# Patient Record
Sex: MALE | Race: BLACK OR AFRICAN AMERICAN | URBAN - METROPOLITAN AREA
[De-identification: names, ages, dates, MRNs, and addresses within clinical notes are randomized per-mention and may not be internally consistent; named-entity substitution may affect disease eponyms.]

---

## 2022-06-21 ENCOUNTER — WEB ENCOUNTER (OUTPATIENT)
Dept: URBAN - METROPOLITAN AREA CLINIC 105 | Facility: CLINIC | Age: 57
End: 2022-06-21

## 2022-06-21 ENCOUNTER — OFFICE VISIT (OUTPATIENT)
Dept: URBAN - METROPOLITAN AREA CLINIC 105 | Facility: CLINIC | Age: 57
End: 2022-06-21
Payer: COMMERCIAL

## 2022-06-21 ENCOUNTER — LAB OUTSIDE AN ENCOUNTER (OUTPATIENT)
Dept: URBAN - METROPOLITAN AREA CLINIC 105 | Facility: CLINIC | Age: 57
End: 2022-06-21

## 2022-06-21 VITALS
WEIGHT: 217.8 LBS | TEMPERATURE: 97.3 F | SYSTOLIC BLOOD PRESSURE: 82 MMHG | HEIGHT: 71 IN | DIASTOLIC BLOOD PRESSURE: 57 MMHG | BODY MASS INDEX: 30.49 KG/M2 | HEART RATE: 81 BPM

## 2022-06-21 DIAGNOSIS — B20 HIV INFECTION, UNSPECIFIED SYMPTOM STATUS: ICD-10-CM

## 2022-06-21 DIAGNOSIS — F12.90 MARIJUANA USE: ICD-10-CM

## 2022-06-21 DIAGNOSIS — K59.00 CONSTIPATION, UNSPECIFIED CONSTIPATION TYPE: ICD-10-CM

## 2022-06-21 DIAGNOSIS — Z12.11 SCREEN FOR COLON CANCER: ICD-10-CM

## 2022-06-21 DIAGNOSIS — R19.7 DIARRHEA, UNSPECIFIED TYPE: ICD-10-CM

## 2022-06-21 PROCEDURE — 99204 OFFICE O/P NEW MOD 45 MIN: CPT | Performed by: INTERNAL MEDICINE

## 2022-06-21 RX ORDER — BICTEGRAVIR SODIUM, EMTRICITABINE, AND TENOFOVIR ALAFENAMIDE FUMARATE 50; 200; 25 MG/1; MG/1; MG/1
TAKE 1 TABLET BY ORAL ROUTE ONCE DAILY FOR 90 DAYS ORAL ONCE A DAY TABLET ORAL
Qty: 30 EACH | Refills: 0 | Status: ACTIVE | COMMUNITY

## 2022-06-21 NOTE — HPI-TODAY'S VISIT:
The patient presents for diarrhea.   Today, the patient reports onset of intermittent watery diarrhea a couple years ago. Diarrhea is 1x/week with multiple episodes in a bout. He denies bleeding or abdominal pain. Between bouts, he sometimes can go at least 5 days without a BM. He noted increased diarrhea while on Biktarvy for HIV - d/c the med a year ago because of diarrhea. He says he has not discussed alternative meds with his PCP (FROLIAN Best at [facility]).  He is only on vit C currently. Social history: Last alcohol use was a couple months ago. No tobacco use. Last marijuana use was 2 weeks ago - frequency is 1x/month.

## 2022-06-23 LAB
A/G RATIO: 0.8
ALBUMIN: 3.8
ALKALINE PHOSPHATASE: 69
ALT (SGPT): 35
AST (SGOT): 42
BASO (ABSOLUTE): 0
BASOS: 0
BILIRUBIN, TOTAL: 0.5
BUN/CREATININE RATIO: 11
BUN: 17
CALCIUM: 9.2
CARBON DIOXIDE, TOTAL: 23
CHLORIDE: 100
CREATININE: 1.55
EGFR: 53
EOS (ABSOLUTE): 0.1
EOS: 1
GLOBULIN, TOTAL: 4.9
GLUCOSE: 113
HEMATOCRIT: 41.4
HEMATOLOGY COMMENTS:: (no result)
HEMOGLOBIN: 13.5
IMMATURE CELLS: (no result)
IMMATURE GRANS (ABS): 0
IMMATURE GRANULOCYTES: 0
IMMUNOGLOBULIN A, QN, SERUM: 194
LYMPHS (ABSOLUTE): 4
LYMPHS: 64
MCH: 29.7
MCHC: 32.6
MCV: 91
MONOCYTES(ABSOLUTE): 0.4
MONOCYTES: 7
NEUTROPHILS (ABSOLUTE): 1.8
NEUTROPHILS: 28
NRBC: (no result)
PLATELETS: 63
POTASSIUM: 4.2
PROTEIN, TOTAL: 8.7
RBC: 4.54
RDW: 14.2
SODIUM: 134
T-TRANSGLUTAMINASE (TTG) IGA: <2
T4,FREE(DIRECT): 1.08
TSH: 1.95
WBC: 6.3

## 2022-08-23 ENCOUNTER — OFFICE VISIT (OUTPATIENT)
Dept: URBAN - METROPOLITAN AREA CLINIC 105 | Facility: CLINIC | Age: 57
End: 2022-08-23

## 2022-10-03 ENCOUNTER — LAB OUTSIDE AN ENCOUNTER (OUTPATIENT)
Dept: URBAN - METROPOLITAN AREA CLINIC 105 | Facility: CLINIC | Age: 57
End: 2022-10-03

## 2022-10-04 ENCOUNTER — WEB ENCOUNTER (OUTPATIENT)
Dept: URBAN - METROPOLITAN AREA CLINIC 105 | Facility: CLINIC | Age: 57
End: 2022-10-04

## 2022-10-04 ENCOUNTER — OFFICE VISIT (OUTPATIENT)
Dept: URBAN - METROPOLITAN AREA CLINIC 105 | Facility: CLINIC | Age: 57
End: 2022-10-04
Payer: COMMERCIAL

## 2022-10-04 ENCOUNTER — LAB OUTSIDE AN ENCOUNTER (OUTPATIENT)
Dept: URBAN - METROPOLITAN AREA CLINIC 105 | Facility: CLINIC | Age: 57
End: 2022-10-04

## 2022-10-04 VITALS
WEIGHT: 212.8 LBS | BODY MASS INDEX: 29.79 KG/M2 | TEMPERATURE: 97.2 F | HEIGHT: 71 IN | DIASTOLIC BLOOD PRESSURE: 61 MMHG | SYSTOLIC BLOOD PRESSURE: 87 MMHG | HEART RATE: 85 BPM

## 2022-10-04 DIAGNOSIS — B20 HIV INFECTION, UNSPECIFIED SYMPTOM STATUS: ICD-10-CM

## 2022-10-04 DIAGNOSIS — K59.00 CONSTIPATION, UNSPECIFIED CONSTIPATION TYPE: ICD-10-CM

## 2022-10-04 DIAGNOSIS — F12.90 MARIJUANA USE: ICD-10-CM

## 2022-10-04 DIAGNOSIS — Z12.11 SCREEN FOR COLON CANCER: ICD-10-CM

## 2022-10-04 DIAGNOSIS — R19.7 DIARRHEA, UNSPECIFIED TYPE: ICD-10-CM

## 2022-10-04 PROCEDURE — 99214 OFFICE O/P EST MOD 30 MIN: CPT | Performed by: INTERNAL MEDICINE

## 2022-10-04 RX ORDER — LOPERAMIDE HYDROCHLORIDE 2 MG/1
1 CAPSULE AS NEEDED CAPSULE ORAL
Status: ACTIVE | COMMUNITY

## 2022-10-04 RX ORDER — BICTEGRAVIR SODIUM, EMTRICITABINE, AND TENOFOVIR ALAFENAMIDE FUMARATE 50; 200; 25 MG/1; MG/1; MG/1
TAKE 1 TABLET BY ORAL ROUTE ONCE DAILY FOR 90 DAYS ORAL ONCE A DAY TABLET ORAL
Qty: 30 EACH | Refills: 0 | Status: ON HOLD | COMMUNITY

## 2022-10-12 LAB — GASTROINTESTINAL PATHOGEN: (no result)

## 2022-10-18 ENCOUNTER — TELEPHONE ENCOUNTER (OUTPATIENT)
Dept: URBAN - METROPOLITAN AREA CLINIC 105 | Facility: CLINIC | Age: 57
End: 2022-10-18

## 2022-11-14 ENCOUNTER — OFFICE VISIT (OUTPATIENT)
Dept: URBAN - METROPOLITAN AREA SURGERY CENTER 16 | Facility: SURGERY CENTER | Age: 57
End: 2022-11-14

## 2022-11-21 ENCOUNTER — OFFICE VISIT (OUTPATIENT)
Dept: URBAN - METROPOLITAN AREA SURGERY CENTER 16 | Facility: SURGERY CENTER | Age: 57
End: 2022-11-21

## 2022-11-21 ENCOUNTER — CLAIMS CREATED FROM THE CLAIM WINDOW (OUTPATIENT)
Dept: URBAN - METROPOLITAN AREA SURGERY CENTER 16 | Facility: SURGERY CENTER | Age: 57
End: 2022-11-21
Payer: COMMERCIAL

## 2022-11-21 DIAGNOSIS — K63.89 BACTERIAL OVERGROWTH SYNDROME: ICD-10-CM

## 2022-11-21 DIAGNOSIS — R19.7 ACUTE DIARRHEA: ICD-10-CM

## 2022-11-21 PROCEDURE — G8907 PT DOC NO EVENTS ON DISCHARG: HCPCS | Performed by: INTERNAL MEDICINE

## 2022-11-21 PROCEDURE — 45380 COLONOSCOPY AND BIOPSY: CPT | Performed by: INTERNAL MEDICINE

## 2022-11-21 RX ORDER — LOPERAMIDE HYDROCHLORIDE 2 MG/1
1 CAPSULE AS NEEDED CAPSULE ORAL
Status: ACTIVE | COMMUNITY

## 2022-11-21 RX ORDER — BICTEGRAVIR SODIUM, EMTRICITABINE, AND TENOFOVIR ALAFENAMIDE FUMARATE 50; 200; 25 MG/1; MG/1; MG/1
TAKE 1 TABLET BY ORAL ROUTE ONCE DAILY FOR 90 DAYS ORAL ONCE A DAY TABLET ORAL
Qty: 30 EACH | Refills: 0 | Status: ON HOLD | COMMUNITY

## 2022-11-29 ENCOUNTER — WEB ENCOUNTER (OUTPATIENT)
Dept: URBAN - METROPOLITAN AREA CLINIC 105 | Facility: CLINIC | Age: 57
End: 2022-11-29

## 2022-11-29 ENCOUNTER — OFFICE VISIT (OUTPATIENT)
Dept: URBAN - METROPOLITAN AREA CLINIC 105 | Facility: CLINIC | Age: 57
End: 2022-11-29
Payer: COMMERCIAL

## 2022-11-29 VITALS
SYSTOLIC BLOOD PRESSURE: 110 MMHG | DIASTOLIC BLOOD PRESSURE: 75 MMHG | HEART RATE: 75 BPM | HEIGHT: 71 IN | BODY MASS INDEX: 29.37 KG/M2 | TEMPERATURE: 97.1 F | WEIGHT: 209.8 LBS

## 2022-11-29 DIAGNOSIS — Z12.11 SCREEN FOR COLON CANCER: ICD-10-CM

## 2022-11-29 DIAGNOSIS — R19.7 DIARRHEA, UNSPECIFIED TYPE: ICD-10-CM

## 2022-11-29 DIAGNOSIS — K59.00 CONSTIPATION, UNSPECIFIED CONSTIPATION TYPE: ICD-10-CM

## 2022-11-29 DIAGNOSIS — F12.90 MARIJUANA USE: ICD-10-CM

## 2022-11-29 DIAGNOSIS — B20 HIV INFECTION, UNSPECIFIED SYMPTOM STATUS: ICD-10-CM

## 2022-11-29 PROCEDURE — 99213 OFFICE O/P EST LOW 20 MIN: CPT | Performed by: INTERNAL MEDICINE

## 2022-11-29 RX ORDER — LOPERAMIDE HYDROCHLORIDE 2 MG/1
1 CAPSULE AS NEEDED CAPSULE ORAL
Status: ACTIVE | COMMUNITY

## 2022-11-29 RX ORDER — BICTEGRAVIR SODIUM, EMTRICITABINE, AND TENOFOVIR ALAFENAMIDE FUMARATE 50; 200; 25 MG/1; MG/1; MG/1
TAKE 1 TABLET BY ORAL ROUTE ONCE DAILY FOR 90 DAYS ORAL ONCE A DAY TABLET ORAL
Qty: 30 EACH | Refills: 0 | Status: ON HOLD | COMMUNITY

## 2022-11-29 NOTE — HPI-TODAY'S VISIT:
The patient presents on follow-up for diarrhea.   On 6/21/22, the patient reported onset of intermittent watery diarrhea a couple years ago. Diarrhea was 1x/week with multiple episodes in a bout. He denied bleeding or abdominal pain. Between bouts, he sometimes could go at least 5 days without a BM. He noted increased diarrhea while on Biktarvy for HIV - d/c the med a year ago because of diarrhea. He said he had not discussed alternative meds with his PCP (FROILAN Best at [facility]).  He was only on vit C currently. Social history: Last alcohol use was a couple months ago. No tobacco use. Last marijuana use was 2 weeks ago - frequency was 1x/month.  On 10/3/22, he said he got the x-ray done following our appt. He also sent in a stool sample in June, but there was an issue with the sample, so he had to resubmit a sample - resubmitted it yesterday. He was still having diarrhea. He saw his PCP since our visit - rx'd metronidazole and loperamide. No stool study was done with the PCP he stated. He did not take metronidazole, but had been taking loperamide for the last 2 weeks, taking 2 pills 1x in a day. Loperamide helped for awhile, but diarrhea returned. He could go 1-3 days without a BM then have days of profuse diarrhea.  He had not been on HIV treatment for almost a year - had initially d/c Biktarvy because he was having diarrhea. He said his PCP had not discussed alternative treatment options with him.  HPI: Today, he says that after his colon, he took metronidazole 3x starting on the 22nd - 1 dose 3 consecutive days. 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  Labs 10/3/22 - Stool study negative. 6/21/22 - CBC normal except platelets 63. CMP normal except creatinine 1.55, AST 42, GFR 53. Celiac negative. TSH/FT4 normal.

## 2022-12-01 ENCOUNTER — TELEPHONE ENCOUNTER (OUTPATIENT)
Dept: URBAN - METROPOLITAN AREA CLINIC 92 | Facility: CLINIC | Age: 57
End: 2022-12-01

## 2022-12-01 RX ORDER — BUDESONIDE 3 MG/1
3 CAPSULES CAPSULE ORAL ONCE A DAY
Qty: 90 | Refills: 2 | OUTPATIENT
Start: 2022-12-01

## 2022-12-01 RX ORDER — BICTEGRAVIR SODIUM, EMTRICITABINE, AND TENOFOVIR ALAFENAMIDE FUMARATE 50; 200; 25 MG/1; MG/1; MG/1
TAKE 1 TABLET BY ORAL ROUTE ONCE DAILY FOR 90 DAYS ORAL ONCE A DAY TABLET ORAL
Qty: 30 EACH | Refills: 0 | Status: ON HOLD | COMMUNITY

## 2022-12-01 RX ORDER — LOPERAMIDE HYDROCHLORIDE 2 MG/1
1 CAPSULE AS NEEDED CAPSULE ORAL
Status: ACTIVE | COMMUNITY

## 2022-12-09 ENCOUNTER — TELEPHONE ENCOUNTER (OUTPATIENT)
Dept: URBAN - METROPOLITAN AREA CLINIC 105 | Facility: CLINIC | Age: 57
End: 2022-12-09

## 2022-12-09 ENCOUNTER — TELEPHONE ENCOUNTER (OUTPATIENT)
Dept: URBAN - METROPOLITAN AREA CLINIC 92 | Facility: CLINIC | Age: 57
End: 2022-12-09

## 2022-12-20 ENCOUNTER — OFFICE VISIT (OUTPATIENT)
Dept: URBAN - METROPOLITAN AREA CLINIC 105 | Facility: CLINIC | Age: 57
End: 2022-12-20
Payer: COMMERCIAL

## 2022-12-20 VITALS
HEIGHT: 71 IN | TEMPERATURE: 97.7 F | WEIGHT: 210 LBS | HEART RATE: 92 BPM | DIASTOLIC BLOOD PRESSURE: 64 MMHG | SYSTOLIC BLOOD PRESSURE: 101 MMHG | BODY MASS INDEX: 29.4 KG/M2

## 2022-12-20 DIAGNOSIS — R19.7 DIARRHEA, UNSPECIFIED TYPE: ICD-10-CM

## 2022-12-20 DIAGNOSIS — F12.90 MARIJUANA USE: ICD-10-CM

## 2022-12-20 DIAGNOSIS — B20 HIV INFECTION, UNSPECIFIED SYMPTOM STATUS: ICD-10-CM

## 2022-12-20 DIAGNOSIS — K59.00 CONSTIPATION, UNSPECIFIED CONSTIPATION TYPE: ICD-10-CM

## 2022-12-20 DIAGNOSIS — Z12.11 SCREEN FOR COLON CANCER: ICD-10-CM

## 2022-12-20 DIAGNOSIS — K52.832 LYMPHOCYTIC COLITIS: ICD-10-CM

## 2022-12-20 PROCEDURE — 99213 OFFICE O/P EST LOW 20 MIN: CPT | Performed by: INTERNAL MEDICINE

## 2022-12-20 RX ORDER — BUDESONIDE 3 MG/1
3 CAPSULES CAPSULE ORAL ONCE A DAY
Qty: 90 | Refills: 2 | Status: ACTIVE | COMMUNITY
Start: 2022-12-01

## 2022-12-20 RX ORDER — BICTEGRAVIR SODIUM, EMTRICITABINE, AND TENOFOVIR ALAFENAMIDE FUMARATE 50; 200; 25 MG/1; MG/1; MG/1
TAKE 1 TABLET BY ORAL ROUTE ONCE DAILY FOR 90 DAYS ORAL ONCE A DAY TABLET ORAL
Qty: 30 EACH | Refills: 0 | Status: ON HOLD | COMMUNITY

## 2022-12-20 RX ORDER — LOPERAMIDE HYDROCHLORIDE 2 MG/1
1 CAPSULE AS NEEDED CAPSULE ORAL
Status: ON HOLD | COMMUNITY

## 2022-12-20 RX ORDER — METRONIDAZOLE 500 MG/1
1 TABLET TABLET ORAL THREE TIMES A DAY
Qty: 42 TABLET | Refills: 0 | Status: ACTIVE | COMMUNITY
Start: 2022-12-20 | End: 2023-01-03

## 2022-12-20 NOTE — HPI-TODAY'S VISIT:
The patient presents on follow-up for diarrhea.   On 6/21/22, the patient reported onset of intermittent watery diarrhea a couple years ago. Diarrhea was 1x/week with multiple episodes in a bout. He denied bleeding or abdominal pain. Between bouts, he sometimes could go at least 5 days without a BM. He noted increased diarrhea while on Biktarvy for HIV - d/c the med a year ago because of diarrhea. He said he had not discussed alternative meds with his PCP (FROILAN Best at [facility]).  He was only on vit C currently. Social history: Last alcohol use was a couple months ago. No tobacco use. Last marijuana use was 2 weeks ago - frequency was 1x/month.  On 10/3/22, he said he got the x-ray done following our appt. He also sent in a stool sample in June, but there was an issue with the sample, so he had to resubmit a sample - resubmitted it yesterday. He was still having diarrhea. He saw his PCP since our visit - rx'd metronidazole and loperamide. No stool study was done with the PCP he stated. He did not take metronidazole, but had been taking loperamide for the last 2 weeks, taking 2 pills 1x in a day. Loperamide helped for awhile, but diarrhea returned. He could go 1-3 days without a BM then have days of profuse diarrhea.  He had not been on HIV treatment for almost a year - had initially d/c Biktarvy because he was having diarrhea. He said his PCP had not discussed alternative treatment options with him.  On 11/29/22, he said that after his colon, he took metronidazole 3x starting on the 22nd - 1 dose 3 consecutive days. He had smear incontinence since his colon, but no BM until today.  HPI: Today, he says he tried Miralax for 3-4 days, but not consistently because he is dealing with health issues among different family members. He has been taking metronidazole PRN for his bowel habit. He can't recall if he has done a bowel prep. He says he was given loperamide, not budesonide, by the pharmacy and is taking it TID.   Labs 10/3/22 - Stool study negative. 6/21/22 - CBC normal except platelets 63. CMP normal except creatinine 1.55, AST 42, GFR 53. Celiac negative. TSH/FT4 normal.

## 2023-01-03 ENCOUNTER — OFFICE VISIT (OUTPATIENT)
Dept: URBAN - METROPOLITAN AREA CLINIC 105 | Facility: CLINIC | Age: 58
End: 2023-01-03

## 2023-01-03 RX ORDER — METRONIDAZOLE 500 MG/1
1 TABLET TABLET ORAL THREE TIMES A DAY
Qty: 42 TABLET | Refills: 0 | Status: ACTIVE | COMMUNITY
Start: 2022-12-20 | End: 2023-01-03

## 2023-01-03 RX ORDER — BICTEGRAVIR SODIUM, EMTRICITABINE, AND TENOFOVIR ALAFENAMIDE FUMARATE 50; 200; 25 MG/1; MG/1; MG/1
TAKE 1 TABLET BY ORAL ROUTE ONCE DAILY FOR 90 DAYS ORAL ONCE A DAY TABLET ORAL
Qty: 30 EACH | Refills: 0 | Status: ON HOLD | COMMUNITY

## 2023-01-03 RX ORDER — BUDESONIDE 3 MG/1
3 CAPSULES CAPSULE ORAL ONCE A DAY
Qty: 90 | Refills: 2 | Status: ACTIVE | COMMUNITY
Start: 2022-12-01

## 2023-01-03 RX ORDER — LOPERAMIDE HYDROCHLORIDE 2 MG/1
1 CAPSULE AS NEEDED CAPSULE ORAL
Status: ON HOLD | COMMUNITY

## 2023-01-03 NOTE — HPI-TODAY'S VISIT:
The patient presents on follow-up for diarrhea.   On 6/21/22, the patient reported onset of intermittent watery diarrhea a couple years ago. Diarrhea was 1x/week with multiple episodes in a bout. He denied bleeding or abdominal pain. Between bouts, he sometimes could go at least 5 days without a BM. He noted increased diarrhea while on Biktarvy for HIV - d/c the med a year ago because of diarrhea. He said he had not discussed alternative meds with his PCP (FROILAN Best at [facility]).  He was only on vit C currently. Social history: Last alcohol use was a couple months ago. No tobacco use. Last marijuana use was 2 weeks ago - frequency was 1x/month.  On 10/3/22, he said he got the x-ray done following our appt. He also sent in a stool sample in June, but there was an issue with the sample, so he had to resubmit a sample - resubmitted it yesterday. He was still having diarrhea. He saw his PCP since our visit - rx'd metronidazole and loperamide. No stool study was done with the PCP he stated. He did not take metronidazole, but had been taking loperamide for the last 2 weeks, taking 2 pills 1x in a day. Loperamide helped for awhile, but diarrhea returned. He could go 1-3 days without a BM then have days of profuse diarrhea.  He had not been on HIV treatment for almost a year - had initially d/c Biktarvy because he was having diarrhea. He said his PCP had not discussed alternative treatment options with him.  On 11/29/22, he said that after his colon, he took metronidazole 3x starting on the 22nd - 1 dose 3 consecutive days. He had smear incontinence since his colon, but no BM until today.  On 12/20/22, he said he tried Miralax for 3-4 days, but not consistently because he was dealing with health issues among different family members. He had been taking metronidazole PRN for his bowel habit. He could not recall if he had done a bowel prep. He said he was given loperamide, not budesonide, by the pharmacy and was taking it TID.   Labs 10/3/22 - Stool study negative. 6/21/22 - CBC normal except platelets 63. CMP normal except creatinine 1.55, AST 42, GFR 53. Celiac negative. TSH/FT4 normal.

## 2023-01-31 ENCOUNTER — OFFICE VISIT (OUTPATIENT)
Dept: URBAN - METROPOLITAN AREA CLINIC 105 | Facility: CLINIC | Age: 58
End: 2023-01-31
Payer: COMMERCIAL

## 2023-01-31 VITALS
TEMPERATURE: 96.4 F | DIASTOLIC BLOOD PRESSURE: 66 MMHG | HEART RATE: 68 BPM | BODY MASS INDEX: 29.18 KG/M2 | HEIGHT: 71 IN | WEIGHT: 208.4 LBS | SYSTOLIC BLOOD PRESSURE: 94 MMHG

## 2023-01-31 DIAGNOSIS — R19.7 DIARRHEA, UNSPECIFIED TYPE: ICD-10-CM

## 2023-01-31 DIAGNOSIS — F12.90 MARIJUANA USE: ICD-10-CM

## 2023-01-31 DIAGNOSIS — B20 HIV INFECTION, UNSPECIFIED SYMPTOM STATUS: ICD-10-CM

## 2023-01-31 DIAGNOSIS — K52.832 LYMPHOCYTIC COLITIS: ICD-10-CM

## 2023-01-31 DIAGNOSIS — K59.00 CONSTIPATION, UNSPECIFIED CONSTIPATION TYPE: ICD-10-CM

## 2023-01-31 DIAGNOSIS — Z12.11 SCREEN FOR COLON CANCER: ICD-10-CM

## 2023-01-31 PROCEDURE — 99214 OFFICE O/P EST MOD 30 MIN: CPT | Performed by: INTERNAL MEDICINE

## 2023-01-31 RX ORDER — BUDESONIDE 3 MG/1
3 CAPSULES CAPSULE ORAL ONCE A DAY
Qty: 90 | Refills: 2 | COMMUNITY
Start: 2022-12-01

## 2023-01-31 RX ORDER — LINACLOTIDE 72 UG/1
1 -2 CAPSULES ON AN EMPTY STOMACH CAPSULE, GELATIN COATED ORAL ONCE A DAY
Qty: 30 | Refills: 2 | OUTPATIENT
Start: 2023-01-31 | End: 2023-05-01

## 2023-01-31 RX ORDER — BICTEGRAVIR SODIUM, EMTRICITABINE, AND TENOFOVIR ALAFENAMIDE FUMARATE 50; 200; 25 MG/1; MG/1; MG/1
TAKE 1 TABLET BY ORAL ROUTE ONCE DAILY FOR 90 DAYS ORAL ONCE A DAY TABLET ORAL
Qty: 30 EACH | Refills: 0 | Status: ACTIVE | COMMUNITY

## 2023-01-31 RX ORDER — LOPERAMIDE HYDROCHLORIDE 2 MG/1
1 CAPSULE AS NEEDED CAPSULE ORAL
Status: ACTIVE | COMMUNITY

## 2023-01-31 NOTE — HPI-TODAY'S VISIT:
The patient presents on follow-up for diarrhea.   On 6/21/22, the patient reported onset of intermittent watery diarrhea a couple years ago. Diarrhea was 1x/week with multiple episodes in a bout. He denied bleeding or abdominal pain. Between bouts, he sometimes could go at least 5 days without a BM. He noted increased diarrhea while on Biktarvy for HIV - d/c the med a year ago because of diarrhea. He said he had not discussed alternative meds with his PCP (FROILAN Best at [facility]).  He was only on vit C currently. Social history: Last alcohol use was a couple months ago. No tobacco use. Last marijuana use was 2 weeks ago - frequency was 1x/month.  On 10/3/22, he said he got the x-ray done following our appt. He also sent in a stool sample in June, but there was an issue with the sample, so he had to resubmit a sample - resubmitted it yesterday. He was still having diarrhea. He saw his PCP since our visit - rx'd metronidazole and loperamide. No stool study was done with the PCP he stated. He did not take metronidazole, but had been taking loperamide for the last 2 weeks, taking 2 pills 1x in a day. Loperamide helped for awhile, but diarrhea returned. He could go 1-3 days without a BM then have days of profuse diarrhea.  He had not been on HIV treatment for almost a year - had initially d/c Biktarvy because he was having diarrhea. He said his PCP had not discussed alternative treatment options with him.  On 11/29/22, he said that after his colon, he took metronidazole 3x starting on the 22nd - 1 dose 3 consecutive days. He had smear incontinence since his colon, but no BM until today.  On 12/20/22, he said he tried Miralax for 3-4 days, but not consistently because he was dealing with health issues among different family members. He had been taking metronidazole PRN for his bowel habit. He could not recall if he had done a bowel prep. He said he was given loperamide, not budesonide, by the pharmacy and was taking it TID.  HPI: Today, he says Miralax 1 cap QD was working, but he has been having frequent diarrhea (at least 5-6x/day - watery) the past couple days. He has already had 3-4 BMs this morning. Prior, he was going 3-4 days w/o a BM and had some incomplete evac (no straining) whenever he would have a BM. He has not been taking Imodium.   Labs 10/3/22 - Stool study negative. 6/21/22 - CBC normal except platelets 63. CMP normal except creatinine 1.55, AST 42, GFR 53. Celiac negative. TSH/FT4 normal.

## 2023-02-22 ENCOUNTER — TELEPHONE ENCOUNTER (OUTPATIENT)
Dept: URBAN - METROPOLITAN AREA CLINIC 105 | Facility: CLINIC | Age: 58
End: 2023-02-22

## 2023-02-28 ENCOUNTER — WEB ENCOUNTER (OUTPATIENT)
Dept: URBAN - METROPOLITAN AREA CLINIC 105 | Facility: CLINIC | Age: 58
End: 2023-02-28

## 2023-03-03 ENCOUNTER — OFFICE VISIT (OUTPATIENT)
Dept: URBAN - METROPOLITAN AREA CLINIC 105 | Facility: CLINIC | Age: 58
End: 2023-03-03

## 2023-03-03 ENCOUNTER — OFFICE VISIT (OUTPATIENT)
Dept: URBAN - METROPOLITAN AREA TELEHEALTH 2 | Facility: TELEHEALTH | Age: 58
End: 2023-03-03
Payer: COMMERCIAL

## 2023-03-03 ENCOUNTER — TELEPHONE ENCOUNTER (OUTPATIENT)
Dept: URBAN - METROPOLITAN AREA CLINIC 105 | Facility: CLINIC | Age: 58
End: 2023-03-03

## 2023-03-03 ENCOUNTER — WEB ENCOUNTER (OUTPATIENT)
Dept: URBAN - METROPOLITAN AREA CLINIC 105 | Facility: CLINIC | Age: 58
End: 2023-03-03

## 2023-03-03 VITALS — BODY MASS INDEX: 29.18 KG/M2 | WEIGHT: 208.4 LBS | HEIGHT: 71 IN

## 2023-03-03 VITALS — HEIGHT: 71 IN

## 2023-03-03 DIAGNOSIS — K59.00 CONSTIPATION, UNSPECIFIED CONSTIPATION TYPE: ICD-10-CM

## 2023-03-03 DIAGNOSIS — K52.832 LYMPHOCYTIC COLITIS: ICD-10-CM

## 2023-03-03 DIAGNOSIS — R19.7 ACUTE DIARRHEA: ICD-10-CM

## 2023-03-03 DIAGNOSIS — B20 HIV INFECTION, UNSPECIFIED SYMPTOM STATUS: ICD-10-CM

## 2023-03-03 PROBLEM — 86406008: Status: ACTIVE | Noted: 2022-06-21

## 2023-03-03 PROBLEM — 14760008: Status: ACTIVE | Noted: 2022-06-21

## 2023-03-03 PROCEDURE — 99214 OFFICE O/P EST MOD 30 MIN: CPT | Performed by: INTERNAL MEDICINE

## 2023-03-03 RX ORDER — BUDESONIDE 3 MG/1
3 CAPSULES CAPSULE ORAL ONCE A DAY
Qty: 90 | Refills: 2 | COMMUNITY
Start: 2022-12-01

## 2023-03-03 RX ORDER — LOPERAMIDE HYDROCHLORIDE 2 MG/1
1 CAPSULE AS NEEDED CAPSULE ORAL
Status: ACTIVE | COMMUNITY

## 2023-03-03 RX ORDER — BICTEGRAVIR SODIUM, EMTRICITABINE, AND TENOFOVIR ALAFENAMIDE FUMARATE 50; 200; 25 MG/1; MG/1; MG/1
TAKE 1 TABLET BY ORAL ROUTE ONCE DAILY FOR 90 DAYS ORAL ONCE A DAY TABLET ORAL
Qty: 30 EACH | Refills: 0 | Status: ACTIVE | COMMUNITY

## 2023-03-03 RX ORDER — BUDESONIDE 3 MG/1
3 CAPSULES CAPSULE ORAL ONCE A DAY
Qty: 90 | Refills: 2 | OUTPATIENT
Start: 2022-12-01

## 2023-03-03 RX ORDER — LINACLOTIDE 72 UG/1
1 -2 CAPSULES ON AN EMPTY STOMACH CAPSULE, GELATIN COATED ORAL ONCE A DAY
Qty: 30 | Refills: 2 | Status: ACTIVE | COMMUNITY
Start: 2023-01-31 | End: 2023-05-01

## 2023-03-03 NOTE — HPI-TODAY'S VISIT:
The patient presents on follow-up for diarrhea.   On 6/21/22, the patient reported onset of intermittent watery diarrhea a couple years ago. Diarrhea was 1x/week with multiple episodes in a bout. He denied bleeding or abdominal pain. Between bouts, he sometimes could go at least 5 days without a BM. He noted increased diarrhea while on Biktarvy for HIV - d/c the med a year ago because of diarrhea. He said he had not discussed alternative meds with his PCP (FROILAN Best at [facility]).  He was only on vit C currently. Social history: Last alcohol use was a couple months ago. No tobacco use. Last marijuana use was 2 weeks ago - frequency was 1x/month.  On 10/3/22, he said he got the x-ray done following our appt. He also sent in a stool sample in June, but there was an issue with the sample, so he had to resubmit a sample - resubmitted it yesterday. He was still having diarrhea. He saw his PCP since our visit - rx'd metronidazole and loperamide. No stool study was done with the PCP he stated. He did not take metronidazole, but had been taking loperamide for the last 2 weeks, taking 2 pills 1x in a day. Loperamide helped for awhile, but diarrhea returned. He could go 1-3 days without a BM then have days of profuse diarrhea.  He had not been on HIV treatment for almost a year - had initially d/c Biktarvy because he was having diarrhea. He said his PCP had not discussed alternative treatment options with him.  On 11/29/22, he said that after his colon, he took metronidazole 3x starting on the 22nd - 1 dose 3 consecutive days. He had smear incontinence since his colon, but no BM until today.  On 12/20/22, he said he tried Miralax for 3-4 days, but not consistently because he was dealing with health issues among different family members. He had been taking metronidazole PRN for his bowel habit. He could not recall if he had done a bowel prep. He said he was given loperamide, not budesonide, by the pharmacy and was taking it TID.  On 1/31/23, he said Miralax 1 cap QD was working, but he had been having frequent diarrhea (at least 5-6x/day - watery) the past couple days. He had already had 3-4 BMs this morning. Prior, he was going 3-4 days w/o a BM and had some incomplete evac (no straining) whenever he would have a BM. He had not been taking Imodium.  HPI: Today, he says he last took Miralax 2 days ago - takes QOD. He also takes an Imodium if not taking Miralax. He is not on Linzess due to insurance change. He tried it for a 1.5 weeks daily, but had too much diarrhea. He has 5-6 watery BMs QD.    Labs 10/3/22 - Stool study negative. 6/21/22 - CBC normal except platelets 63. CMP normal except creatinine 1.55, AST 42, GFR 53. Celiac negative. TSH/FT4 normal.

## 2023-03-07 ENCOUNTER — WEB ENCOUNTER (OUTPATIENT)
Dept: URBAN - METROPOLITAN AREA CLINIC 105 | Facility: CLINIC | Age: 58
End: 2023-03-07

## 2023-03-09 ENCOUNTER — TELEPHONE ENCOUNTER (OUTPATIENT)
Dept: URBAN - METROPOLITAN AREA CLINIC 105 | Facility: CLINIC | Age: 58
End: 2023-03-09

## 2023-03-14 ENCOUNTER — TELEPHONE ENCOUNTER (OUTPATIENT)
Dept: URBAN - METROPOLITAN AREA CLINIC 105 | Facility: CLINIC | Age: 58
End: 2023-03-14

## 2023-03-14 ENCOUNTER — OFFICE VISIT (OUTPATIENT)
Dept: URBAN - METROPOLITAN AREA CLINIC 105 | Facility: CLINIC | Age: 58
End: 2023-03-14
Payer: COMMERCIAL

## 2023-03-14 VITALS
DIASTOLIC BLOOD PRESSURE: 70 MMHG | BODY MASS INDEX: 29.18 KG/M2 | TEMPERATURE: 97.3 F | HEIGHT: 71 IN | SYSTOLIC BLOOD PRESSURE: 100 MMHG | WEIGHT: 208.4 LBS | HEART RATE: 80 BPM

## 2023-03-14 DIAGNOSIS — Z12.11 SCREEN FOR COLON CANCER: ICD-10-CM

## 2023-03-14 DIAGNOSIS — B20 HIV INFECTION, UNSPECIFIED SYMPTOM STATUS: ICD-10-CM

## 2023-03-14 DIAGNOSIS — K52.832 LYMPHOCYTIC COLITIS: ICD-10-CM

## 2023-03-14 DIAGNOSIS — K52.9 NONINFECTIOUS DIARRHEA: ICD-10-CM

## 2023-03-14 DIAGNOSIS — K59.00 CONSTIPATION, UNSPECIFIED CONSTIPATION TYPE: ICD-10-CM

## 2023-03-14 DIAGNOSIS — F12.90 MARIJUANA USE: ICD-10-CM

## 2023-03-14 PROCEDURE — 99214 OFFICE O/P EST MOD 30 MIN: CPT | Performed by: INTERNAL MEDICINE

## 2023-03-14 RX ORDER — LINACLOTIDE 72 UG/1
1 -2 CAPSULES ON AN EMPTY STOMACH CAPSULE, GELATIN COATED ORAL ONCE A DAY
Qty: 30 | Refills: 2 | Status: ACTIVE | COMMUNITY
Start: 2023-01-31 | End: 2023-05-01

## 2023-03-14 RX ORDER — BICTEGRAVIR SODIUM, EMTRICITABINE, AND TENOFOVIR ALAFENAMIDE FUMARATE 50; 200; 25 MG/1; MG/1; MG/1
TAKE 1 TABLET BY ORAL ROUTE ONCE DAILY FOR 90 DAYS ORAL ONCE A DAY TABLET ORAL
Qty: 30 EACH | Refills: 0 | Status: ACTIVE | COMMUNITY

## 2023-03-14 RX ORDER — BUDESONIDE 3 MG/1
3 CAPSULES CAPSULE ORAL ONCE A DAY
Qty: 90 | Refills: 2 | Status: ACTIVE | COMMUNITY
Start: 2022-12-01

## 2023-03-14 RX ORDER — LOPERAMIDE HYDROCHLORIDE 2 MG/1
1 CAPSULE AS NEEDED CAPSULE ORAL
Status: ACTIVE | COMMUNITY

## 2023-03-14 NOTE — HPI-TODAY'S VISIT:
The patient presents on follow-up for diarrhea.   On 6/21/22, the patient reported onset of intermittent watery diarrhea a couple years ago. Diarrhea was 1x/week with multiple episodes in a bout. He denied bleeding or abdominal pain. Between bouts, he sometimes could go at least 5 days without a BM. He noted increased diarrhea while on Biktarvy for HIV - d/c the med a year ago because of diarrhea. He said he had not discussed alternative meds with his PCP (FROILAN Best at [facility]).  He was only on vit C currently. Social history: Last alcohol use was a couple months ago. No tobacco use. Last marijuana use was 2 weeks ago - frequency was 1x/month.  On 10/3/22, he said he got the x-ray done following our appt. He also sent in a stool sample in June, but there was an issue with the sample, so he had to resubmit a sample - resubmitted it yesterday. He was still having diarrhea. He saw his PCP since our visit - rx'd metronidazole and loperamide. No stool study was done with the PCP he stated. He did not take metronidazole, but had been taking loperamide for the last 2 weeks, taking 2 pills 1x in a day. Loperamide helped for awhile, but diarrhea returned. He could go 1-3 days without a BM then have days of profuse diarrhea.  He had not been on HIV treatment for almost a year - had initially d/c Biktarvy because he was having diarrhea. He said his PCP had not discussed alternative treatment options with him.  On 11/29/22, he said that after his colon, he took metronidazole 3x starting on the 22nd - 1 dose 3 consecutive days. He had smear incontinence since his colon, but no BM until today.  On 12/20/22, he said he tried Miralax for 3-4 days, but not consistently because he was dealing with health issues among different family members. He had been taking metronidazole PRN for his bowel habit. He could not recall if he had done a bowel prep. He said he was given loperamide, not budesonide, by the pharmacy and was taking it TID.  On 1/31/23, he said Miralax 1 cap QD was working, but he had been having frequent diarrhea (at least 5-6x/day - watery) the past couple days. He had already had 3-4 BMs this morning. Prior, he was going 3-4 days w/o a BM and had some incomplete evac (no straining) whenever he would have a BM. He had not been taking Imodium.  On 3/3/23, he said he last took Miralax 2 days ago - took QOD. He also took an Imodium if not taking Miralax. He was not on Linzess due to insurance change. He tried it for a 1.5 weeks daily, but had too much diarrhea. He had 5-6 watery BMs QD.  HPI: Today, he says he took budesonide 3 pills QD for 1-2 weeks with no benefit. He was not able to get crofelemer because insurance did not cover it. A week ago, his bowel habit was worse, but now it's better. He has not had a BM in 2 days. No ibuprofen/Advil use. He has not taken Imodium in a week.    Labs 10/3/22 - Stool study negative. 6/21/22 - CBC normal except platelets 63. CMP normal except creatinine 1.55, AST 42, GFR 53. Celiac negative. TSH/FT4 normal.

## 2023-03-17 ENCOUNTER — WEB ENCOUNTER (OUTPATIENT)
Dept: URBAN - METROPOLITAN AREA CLINIC 105 | Facility: CLINIC | Age: 58
End: 2023-03-17

## 2023-03-19 PROBLEM — 1187071008: Status: ACTIVE | Noted: 2022-12-01

## 2023-04-10 ENCOUNTER — WEB ENCOUNTER (OUTPATIENT)
Dept: URBAN - METROPOLITAN AREA CLINIC 105 | Facility: CLINIC | Age: 58
End: 2023-04-10

## 2023-09-21 ENCOUNTER — OFFICE VISIT (OUTPATIENT)
Dept: URBAN - METROPOLITAN AREA CLINIC 105 | Facility: CLINIC | Age: 58
End: 2023-09-21

## 2023-09-21 RX ORDER — BUDESONIDE 3 MG/1
3 CAPSULES CAPSULE ORAL ONCE A DAY
Qty: 90 | Refills: 2 | COMMUNITY
Start: 2022-12-01

## 2023-09-21 RX ORDER — LOPERAMIDE HYDROCHLORIDE 2 MG/1
1 CAPSULE AS NEEDED CAPSULE ORAL
COMMUNITY

## 2023-09-21 RX ORDER — BICTEGRAVIR SODIUM, EMTRICITABINE, AND TENOFOVIR ALAFENAMIDE FUMARATE 50; 200; 25 MG/1; MG/1; MG/1
TAKE 1 TABLET BY ORAL ROUTE ONCE DAILY FOR 90 DAYS ORAL ONCE A DAY TABLET ORAL
Qty: 30 EACH | Refills: 0 | COMMUNITY

## 2023-12-08 ENCOUNTER — OFFICE VISIT (OUTPATIENT)
Dept: URBAN - METROPOLITAN AREA CLINIC 105 | Facility: CLINIC | Age: 58
End: 2023-12-08

## 2023-12-08 RX ORDER — BUDESONIDE 3 MG/1
3 CAPSULES CAPSULE ORAL ONCE A DAY
Qty: 90 | Refills: 2 | COMMUNITY
Start: 2022-12-01

## 2023-12-08 RX ORDER — LOPERAMIDE HYDROCHLORIDE 2 MG/1
1 CAPSULE AS NEEDED CAPSULE ORAL
COMMUNITY

## 2023-12-08 RX ORDER — BICTEGRAVIR SODIUM, EMTRICITABINE, AND TENOFOVIR ALAFENAMIDE FUMARATE 50; 200; 25 MG/1; MG/1; MG/1
TAKE 1 TABLET BY ORAL ROUTE ONCE DAILY FOR 90 DAYS ORAL ONCE A DAY TABLET ORAL
Qty: 30 EACH | Refills: 0 | COMMUNITY

## 2023-12-08 NOTE — HPI-TODAY'S VISIT:
The patient presents on follow-up for diarrhea.   On 6/21/22, the patient reported onset of intermittent watery diarrhea a couple years ago. Diarrhea was 1x/week with multiple episodes in a bout. He denied bleeding or abdominal pain. Between bouts, he sometimes could go at least 5 days without a BM. He noted increased diarrhea while on Biktarvy for HIV - d/c the med a year ago because of diarrhea. He said he had not discussed alternative meds with his PCP (FROILAN Best at [facility]).  He was only on vit C currently. Social history: Last alcohol use was a couple months ago. No tobacco use. Last marijuana use was 2 weeks ago - frequency was 1x/month.  On 10/3/22, he said he got the x-ray done following our appt. He also sent in a stool sample in June, but there was an issue with the sample, so he had to resubmit a sample - resubmitted it yesterday. He was still having diarrhea. He saw his PCP since our visit - rx'd metronidazole and loperamide. No stool study was done with the PCP he stated. He did not take metronidazole, but had been taking loperamide for the last 2 weeks, taking 2 pills 1x in a day. Loperamide helped for awhile, but diarrhea returned. He could go 1-3 days without a BM then have days of profuse diarrhea.  He had not been on HIV treatment for almost a year - had initially d/c Biktarvy because he was having diarrhea. He said his PCP had not discussed alternative treatment options with him.  On 11/29/22, he said that after his colon, he took metronidazole 3x starting on the 22nd - 1 dose 3 consecutive days. He had smear incontinence since his colon, but no BM until today.  On 12/20/22, he said he tried Miralax for 3-4 days, but not consistently because he was dealing with health issues among different family members. He had been taking metronidazole PRN for his bowel habit. He could not recall if he had done a bowel prep. He said he was given loperamide, not budesonide, by the pharmacy and was taking it TID.  On 1/31/23, he said Miralax 1 cap QD was working, but he had been having frequent diarrhea (at least 5-6x/day - watery) the past couple days. He had already had 3-4 BMs this morning. Prior, he was going 3-4 days w/o a BM and had some incomplete evac (no straining) whenever he would have a BM. He had not been taking Imodium.  On 3/3/23, he said he last took Miralax 2 days ago - took QOD. He also took an Imodium if not taking Miralax. He was not on Linzess due to insurance change. He tried it for a 1.5 weeks daily, but had too much diarrhea. He had 5-6 watery BMs QD.  On 3/14/23, he said he took budesonide 3 pills QD for 1-2 weeks with no benefit. He was not able to get crofelemer because insurance did not cover it. A week ago, his bowel habit was worse, but now it was better. He had not had a BM in 2 days. No ibuprofen/Advil use. He had not taken Imodium in a week.    Labs 7/24/23 - CMP normal except sodium 135, albumin 3.4. CRP 6.2. 7/23/23 - CBC normal except hgb 12.7, platelets 55. 10/3/22 - Stool study negative. 6/21/22 - CBC normal except platelets 63. CMP normal except creatinine 1.55, AST 42, GFR 53. Celiac negative. TSH/FT4 normal.

## 2024-01-18 ENCOUNTER — OFFICE VISIT (OUTPATIENT)
Dept: URBAN - METROPOLITAN AREA CLINIC 105 | Facility: CLINIC | Age: 59
End: 2024-01-18

## 2024-04-30 ENCOUNTER — OV EP (OUTPATIENT)
Dept: URBAN - METROPOLITAN AREA CLINIC 105 | Facility: CLINIC | Age: 59
End: 2024-04-30

## 2024-04-30 VITALS
HEART RATE: 98 BPM | DIASTOLIC BLOOD PRESSURE: 67 MMHG | SYSTOLIC BLOOD PRESSURE: 97 MMHG | BODY MASS INDEX: 31.86 KG/M2 | HEIGHT: 71 IN | WEIGHT: 227.6 LBS | TEMPERATURE: 97.2 F

## 2024-04-30 RX ORDER — MIDODRINE HYDROCHLORIDE 5 MG/1
TABLET ORAL
Qty: 90 TABLET | Status: ACTIVE | COMMUNITY

## 2024-04-30 RX ORDER — BICTEGRAVIR SODIUM, EMTRICITABINE, AND TENOFOVIR ALAFENAMIDE FUMARATE 50; 200; 25 MG/1; MG/1; MG/1
TAKE 1 TABLET BY ORAL ROUTE ONCE DAILY FOR 90 DAYS ORAL ONCE A DAY TABLET ORAL
Qty: 30 EACH | Refills: 0 | Status: ACTIVE | COMMUNITY

## 2024-04-30 RX ORDER — BUDESONIDE 3 MG/1
3 CAPSULES CAPSULE ORAL ONCE A DAY
Qty: 90 | Refills: 2 | Status: ON HOLD | COMMUNITY
Start: 2022-12-01

## 2024-04-30 RX ORDER — LOPERAMIDE HYDROCHLORIDE 2 MG/1
1 CAPSULE AS NEEDED CAPSULE ORAL
Status: ON HOLD | COMMUNITY

## 2024-04-30 NOTE — HPI-TODAY'S VISIT:
The patient presents on follow-up for diarrhea.   On 6/21/22, the patient reported onset of intermittent watery diarrhea a couple years ago. Diarrhea was 1x/week with multiple episodes in a bout. He denied bleeding or abdominal pain. Between bouts, he sometimes could go at least 5 days without a BM. He noted increased diarrhea while on Biktarvy for HIV - d/c the med a year ago because of diarrhea. He said he had not discussed alternative meds with his PCP (FROILAN Best at [facility]).  He was only on vit C currently. Social history: Last alcohol use was a couple months ago. No tobacco use. Last marijuana use was 2 weeks ago - frequency was 1x/month.  On 10/3/22, he said he got the x-ray done following our appt. He also sent in a stool sample in June, but there was an issue with the sample, so he had to resubmit a sample - resubmitted it yesterday. He was still having diarrhea. He saw his PCP since our visit - rx'd metronidazole and loperamide. No stool study was done with the PCP he stated. He did not take metronidazole, but had been taking loperamide for the last 2 weeks, taking 2 pills 1x in a day. Loperamide helped for awhile, but diarrhea returned. He could go 1-3 days without a BM then have days of profuse diarrhea.  He had not been on HIV treatment for almost a year - had initially d/c Biktarvy because he was having diarrhea. He said his PCP had not discussed alternative treatment options with him.  On 11/29/22, he said that after his colon, he took metronidazole 3x starting on the 22nd - 1 dose 3 consecutive days. He had smear incontinence since his colon, but no BM until today.  On 12/20/22, he said he tried Miralax for 3-4 days, but not consistently because he was dealing with health issues among different family members. He had been taking metronidazole PRN for his bowel habit. He could not recall if he had done a bowel prep. He said he was given loperamide, not budesonide, by the pharmacy and was taking it TID.  On 1/31/23, he said Miralax 1 cap QD was working, but he had been having frequent diarrhea (at least 5-6x/day - watery) the past couple days. He had already had 3-4 BMs this morning. Prior, he was going 3-4 days w/o a BM and had some incomplete evac (no straining) whenever he would have a BM. He had not been taking Imodium.  On 3/3/23, he said he last took Miralax 2 days ago - took QOD. He also took an Imodium if not taking Miralax. He was not on Linzess due to insurance change. He tried it for a 1.5 weeks daily, but had too much diarrhea. He had 5-6 watery BMs QD.  On 3/14/23, he said he took budesonide 3 pills QD for 1-2 weeks with no benefit. He was not able to get crofelemer because insurance did not cover it. A week ago, his bowel habit was worse, but now it was better. He had not had a BM in 2 days. No ibuprofen/Advil use. He had not taken Imodium in a week.    HPI: Today, he says he is having trouble with Imodium. He is taking Imodium 1 pill QD. does not take more than that. On Imodium, he has decreased frequency - 5-6 BMs/day. Off Imodium, he would have constant BMs. Another physician gave a rx version of Imodium that may be cheaper.  Hdid not trial Mytesi - prescribed at his last visit 3/14/23.  He states he may have not been covered.    Labs 7/24/23 - CMP normal except sodium 135, albumin 3.4. CRP 6.2. 7/23/23 - CBC normal except hgb 12.7, platelets 55. 10/3/22 - Stool study negative. 6/21/22 - CBC normal except platelets 63. CMP normal except creatinine 1.55, AST 42, GFR 53. Celiac negative. TSH/FT4 normal.

## 2024-05-01 ENCOUNTER — TELEPHONE ENCOUNTER (OUTPATIENT)
Dept: URBAN - METROPOLITAN AREA CLINIC 105 | Facility: CLINIC | Age: 59
End: 2024-05-01

## 2024-05-15 ENCOUNTER — LAB OUTSIDE AN ENCOUNTER (OUTPATIENT)
Dept: URBAN - METROPOLITAN AREA CLINIC 105 | Facility: CLINIC | Age: 59
End: 2024-05-15

## 2024-05-21 LAB
ADENOVIRUS F 40/41: NOT DETECTED
CAMPYLOBACTER: NOT DETECTED
CLOSTRIDIUM DIFFICILE: NOT DETECTED
ENTAMOEBA HISTOLYTICA: NOT DETECTED
ENTEROAGGREGATIVE E.COLI: NOT DETECTED
ENTEROTOXIGENIC E.COLI: NOT DETECTED
ESCHERICHIA COLI O157: NOT DETECTED
GIARDIA LAMBLIA: NOT DETECTED
NOROVIRUS GI/GII: NOT DETECTED
ROTAVIRUS A: NOT DETECTED
SALMONELLA SPP.: NOT DETECTED
SHIGA-LIKE TOXIN PRODUCING E.COLI: NOT DETECTED
SHIGELLA SPP. / ENTEROINVASIVE E.COLI: NOT DETECTED
VIBRIO PARAHAEMOLYTICUS: NOT DETECTED
VIBRIO SPP.: NOT DETECTED
YERSINIA ENTEROCOLITICA: NOT DETECTED

## 2024-05-22 ENCOUNTER — TELEPHONE ENCOUNTER (OUTPATIENT)
Dept: URBAN - METROPOLITAN AREA CLINIC 105 | Facility: CLINIC | Age: 59
End: 2024-05-22

## 2024-06-05 ENCOUNTER — OFFICE VISIT (OUTPATIENT)
Dept: URBAN - METROPOLITAN AREA CLINIC 105 | Facility: CLINIC | Age: 59
End: 2024-06-05

## 2024-07-24 ENCOUNTER — OFFICE VISIT (OUTPATIENT)
Dept: URBAN - METROPOLITAN AREA CLINIC 105 | Facility: CLINIC | Age: 59
End: 2024-07-24
Payer: COMMERCIAL

## 2024-07-24 ENCOUNTER — DASHBOARD ENCOUNTERS (OUTPATIENT)
Age: 59
End: 2024-07-24

## 2024-07-24 VITALS
WEIGHT: 220 LBS | HEART RATE: 81 BPM | TEMPERATURE: 98.8 F | SYSTOLIC BLOOD PRESSURE: 124 MMHG | DIASTOLIC BLOOD PRESSURE: 82 MMHG | BODY MASS INDEX: 30.8 KG/M2 | HEIGHT: 71 IN

## 2024-07-24 DIAGNOSIS — R19.7 DIARRHEA, UNSPECIFIED TYPE: ICD-10-CM

## 2024-07-24 DIAGNOSIS — K52.9 NONINFECTIOUS DIARRHEA: ICD-10-CM

## 2024-07-24 DIAGNOSIS — Z12.11 SCREEN FOR COLON CANCER: ICD-10-CM

## 2024-07-24 DIAGNOSIS — B20 HIV INFECTION, UNSPECIFIED SYMPTOM STATUS: ICD-10-CM

## 2024-07-24 DIAGNOSIS — F12.90 MARIJUANA USE: ICD-10-CM

## 2024-07-24 DIAGNOSIS — K59.00 CONSTIPATION, UNSPECIFIED CONSTIPATION TYPE: ICD-10-CM

## 2024-07-24 DIAGNOSIS — K52.832 LYMPHOCYTIC COLITIS: ICD-10-CM

## 2024-07-24 PROCEDURE — 99213 OFFICE O/P EST LOW 20 MIN: CPT | Performed by: INTERNAL MEDICINE

## 2024-07-24 RX ORDER — BICTEGRAVIR SODIUM, EMTRICITABINE, AND TENOFOVIR ALAFENAMIDE FUMARATE 50; 200; 25 MG/1; MG/1; MG/1
TAKE 1 TABLET BY ORAL ROUTE ONCE DAILY FOR 90 DAYS ORAL ONCE A DAY TABLET ORAL
Qty: 30 EACH | Refills: 0 | Status: ACTIVE | COMMUNITY

## 2024-07-24 RX ORDER — MIDODRINE HYDROCHLORIDE 5 MG/1
TABLET ORAL
Qty: 90 TABLET | Status: ACTIVE | COMMUNITY

## 2024-07-24 RX ORDER — LOPERAMIDE HYDROCHLORIDE 2 MG/1
1 CAPSULE AS NEEDED CAPSULE ORAL
Status: ACTIVE | COMMUNITY

## 2024-07-24 RX ORDER — BUDESONIDE 3 MG/1
3 CAPSULES CAPSULE ORAL ONCE A DAY
Qty: 90 | Refills: 2 | Status: ACTIVE | COMMUNITY
Start: 2022-12-01

## 2024-07-24 NOTE — HPI-TODAY'S VISIT:
The patient presents on follow-up for diarrhea.   On 6/21/22, the patient reported onset of intermittent watery diarrhea a couple years ago. Diarrhea was 1x/week with multiple episodes in a bout. He denied bleeding or abdominal pain. Between bouts, he sometimes could go at least 5 days without a BM. He noted increased diarrhea while on Biktarvy for HIV - d/c the med a year ago because of diarrhea. He said he had not discussed alternative meds with his PCP (FROILAN Best at [facility]).  He was only on vit C currently. Social history: Last alcohol use was a couple months ago. No tobacco use. Last marijuana use was 2 weeks ago - frequency was 1x/month.  On 10/3/22, he said he got the x-ray done following our appt. He also sent in a stool sample in June, but there was an issue with the sample, so he had to resubmit a sample - resubmitted it yesterday. He was still having diarrhea. He saw his PCP since our visit - rx'd metronidazole and loperamide. No stool study was done with the PCP he stated. He did not take metronidazole, but had been taking loperamide for the last 2 weeks, taking 2 pills 1x in a day. Loperamide helped for awhile, but diarrhea returned. He could go 1-3 days without a BM then have days of profuse diarrhea.  He had not been on HIV treatment for almost a year - had initially d/c Biktarvy because he was having diarrhea. He said his PCP had not discussed alternative treatment options with him.  On 11/29/22, he said that after his colon, he took metronidazole 3x starting on the 22nd - 1 dose 3 consecutive days. He had smear incontinence since his colon, but no BM until today.  On 12/20/22, he said he tried Miralax for 3-4 days, but not consistently because he was dealing with health issues among different family members. He had been taking metronidazole PRN for his bowel habit. He could not recall if he had done a bowel prep. He said he was given loperamide, not budesonide, by the pharmacy and was taking it TID.  On 1/31/23, he said Miralax 1 cap QD was working, but he had been having frequent diarrhea (at least 5-6x/day - watery) the past couple days. He had already had 3-4 BMs this morning. Prior, he was going 3-4 days w/o a BM and had some incomplete evac (no straining) whenever he would have a BM. He had not been taking Imodium.  On 3/3/23, he said he last took Miralax 2 days ago - took QOD. He also took an Imodium if not taking Miralax. He was not on Linzess due to insurance change. He tried it for a 1.5 weeks daily, but had too much diarrhea. He had 5-6 watery BMs QD.  On 3/14/23, he said he took budesonide 3 pills QD for 1-2 weeks with no benefit. He was not able to get crofelemer because insurance did not cover it. A week ago, his bowel habit was worse, but now it was better. He had not had a BM in 2 days. No ibuprofen/Advil use. He had not taken Imodium in a week.    On 4/30/24, he said he was having trouble with Imodium. He was taking Imodium 1 pill QD. did not take more than that. On Imodium, he had decreased frequency - 5-6 BMs/day. Off Imodium, he would have constant BMs. Another physician gave a rx version of Imodium that may be cheaper.  He did not trial Mytesi - prescribed at his last visit 3/14/23.  He stated he may have not been covered.    HPI: Today, he says he started Mytesi, but d/c because it "didn't work like it should" - urgency and incontinence. He went back to taking Imodium, 1 pill QD or 1x/5 days depending on how he felt. B6 supplements make his BMs less loose, so he did not need as much Imodium.  Labs 5/15/24 - Stool study negative.  7/24/23 - CMP normal except sodium 135, albumin 3.4. CRP 6.2. 7/23/23 - CBC normal except hgb 12.7, platelets 55. 10/3/22 - Stool study negative. 6/21/22 - CBC normal except platelets 63. CMP normal except creatinine 1.55, AST 42, GFR 53. Celiac negative. TSH/FT4 normal.

## 2024-10-23 ENCOUNTER — OFFICE VISIT (OUTPATIENT)
Dept: URBAN - METROPOLITAN AREA CLINIC 105 | Facility: CLINIC | Age: 59
End: 2024-10-23

## 2024-10-23 RX ORDER — MIDODRINE HYDROCHLORIDE 5 MG/1
TABLET ORAL
Qty: 90 TABLET | COMMUNITY

## 2024-10-23 RX ORDER — LOPERAMIDE HYDROCHLORIDE 2 MG/1
1 CAPSULE AS NEEDED CAPSULE ORAL
COMMUNITY

## 2024-10-23 RX ORDER — BUDESONIDE 3 MG/1
3 CAPSULES CAPSULE ORAL ONCE A DAY
Qty: 90 | Refills: 2 | COMMUNITY
Start: 2022-12-01

## 2024-10-23 RX ORDER — BICTEGRAVIR SODIUM, EMTRICITABINE, AND TENOFOVIR ALAFENAMIDE FUMARATE 50; 200; 25 MG/1; MG/1; MG/1
TAKE 1 TABLET BY ORAL ROUTE ONCE DAILY FOR 90 DAYS ORAL ONCE A DAY TABLET ORAL
Qty: 30 EACH | Refills: 0 | COMMUNITY

## 2024-12-06 ENCOUNTER — OFFICE VISIT (OUTPATIENT)
Dept: URBAN - METROPOLITAN AREA CLINIC 105 | Facility: CLINIC | Age: 59
End: 2024-12-06

## 2025-04-18 ENCOUNTER — OFFICE VISIT (OUTPATIENT)
Dept: URBAN - METROPOLITAN AREA CLINIC 105 | Facility: CLINIC | Age: 60
End: 2025-04-18

## 2025-04-18 RX ORDER — MIDODRINE HYDROCHLORIDE 5 MG/1
TABLET ORAL
Qty: 90 TABLET | COMMUNITY

## 2025-04-18 RX ORDER — BUDESONIDE 3 MG/1
3 CAPSULES CAPSULE ORAL ONCE A DAY
Qty: 90 | Refills: 2 | COMMUNITY
Start: 2022-12-01

## 2025-04-18 RX ORDER — BICTEGRAVIR SODIUM, EMTRICITABINE, AND TENOFOVIR ALAFENAMIDE FUMARATE 50; 200; 25 MG/1; MG/1; MG/1
TAKE 1 TABLET BY ORAL ROUTE ONCE DAILY FOR 90 DAYS ORAL ONCE A DAY TABLET ORAL
Qty: 30 EACH | Refills: 0 | COMMUNITY

## 2025-04-18 RX ORDER — LOPERAMIDE HYDROCHLORIDE 2 MG/1
1 CAPSULE AS NEEDED CAPSULE ORAL
COMMUNITY

## 2025-04-18 NOTE — HPI-TODAY'S VISIT:
The patient presents on follow-up for diarrhea.   On 6/21/22, the patient reported onset of intermittent watery diarrhea a couple years ago. Diarrhea was 1x/week with multiple episodes in a bout. He denied bleeding or abdominal pain. Between bouts, he sometimes could go at least 5 days without a BM. He noted increased diarrhea while on Biktarvy for HIV - d/c the med a year ago because of diarrhea. He said he had not discussed alternative meds with his PCP (FROILAN Best at [facility]).  He was only on vit C currently. Social history: Last alcohol use was a couple months ago. No tobacco use. Last marijuana use was 2 weeks ago - frequency was 1x/month.  On 10/3/22, he said he got the x-ray done following our appt. He also sent in a stool sample in June, but there was an issue with the sample, so he had to resubmit a sample - resubmitted it yesterday. He was still having diarrhea. He saw his PCP since our visit - rx'd metronidazole and loperamide. No stool study was done with the PCP he stated. He did not take metronidazole, but had been taking loperamide for the last 2 weeks, taking 2 pills 1x in a day. Loperamide helped for awhile, but diarrhea returned. He could go 1-3 days without a BM then have days of profuse diarrhea.  He had not been on HIV treatment for almost a year - had initially d/c Biktarvy because he was having diarrhea. He said his PCP had not discussed alternative treatment options with him.  On 11/29/22, he said that after his colon, he took metronidazole 3x starting on the 22nd - 1 dose 3 consecutive days. He had smear incontinence since his colon, but no BM until today.  On 12/20/22, he said he tried Miralax for 3-4 days, but not consistently because he was dealing with health issues among different family members. He had been taking metronidazole PRN for his bowel habit. He could not recall if he had done a bowel prep. He said he was given loperamide, not budesonide, by the pharmacy and was taking it TID.  On 1/31/23, he said Miralax 1 cap QD was working, but he had been having frequent diarrhea (at least 5-6x/day - watery) the past couple days. He had already had 3-4 BMs this morning. Prior, he was going 3-4 days w/o a BM and had some incomplete evac (no straining) whenever he would have a BM. He had not been taking Imodium.  On 3/3/23, he said he last took Miralax 2 days ago - took QOD. He also took an Imodium if not taking Miralax. He was not on Linzess due to insurance change. He tried it for a 1.5 weeks daily, but had too much diarrhea. He had 5-6 watery BMs QD.  On 3/14/23, he said he took budesonide 3 pills QD for 1-2 weeks with no benefit. He was not able to get crofelemer because insurance did not cover it. A week ago, his bowel habit was worse, but now it was better. He had not had a BM in 2 days. No ibuprofen/Advil use. He had not taken Imodium in a week.    On 4/30/24, he said he was having trouble with Imodium. He was taking Imodium 1 pill QD. did not take more than that. On Imodium, he had decreased frequency - 5-6 BMs/day. Off Imodium, he would have constant BMs. Another physician gave a rx version of Imodium that may be cheaper.  He did not trial Mytesi - prescribed at his last visit 3/14/23.  He stated he may have not been covered.    On 7/24/24, he said he started Mytesi, but d/c because it "didn't work like it should" - urgency and incontinence. He went back to taking Imodium, 1 pill QD or 1x/5 days depending on how he felt. B6 supplements made his BMs less loose, so he did not need as much Imodium.  Labs 5/15/24 - Stool study negative.  7/24/23 - CMP normal except sodium 135, albumin 3.4. CRP 6.2. 7/23/23 - CBC normal except hgb 12.7, platelets 55. 10/3/22 - Stool study negative. 6/21/22 - CBC normal except platelets 63. CMP normal except creatinine 1.55, AST 42, GFR 53. Celiac negative. TSH/FT4 normal.

## 2025-05-06 ENCOUNTER — OFFICE VISIT (OUTPATIENT)
Dept: URBAN - METROPOLITAN AREA CLINIC 105 | Facility: CLINIC | Age: 60
End: 2025-05-06

## 2025-05-06 RX ORDER — LOPERAMIDE HYDROCHLORIDE 2 MG/1
1 CAPSULE AS NEEDED CAPSULE ORAL
COMMUNITY

## 2025-05-06 RX ORDER — BICTEGRAVIR SODIUM, EMTRICITABINE, AND TENOFOVIR ALAFENAMIDE FUMARATE 50; 200; 25 MG/1; MG/1; MG/1
TAKE 1 TABLET BY ORAL ROUTE ONCE DAILY FOR 90 DAYS ORAL ONCE A DAY TABLET ORAL
Qty: 30 EACH | Refills: 0 | COMMUNITY

## 2025-05-06 RX ORDER — BUDESONIDE 3 MG/1
3 CAPSULES CAPSULE ORAL ONCE A DAY
Qty: 90 | Refills: 2 | COMMUNITY
Start: 2022-12-01

## 2025-05-06 RX ORDER — MIDODRINE HYDROCHLORIDE 5 MG/1
TABLET ORAL
Qty: 90 TABLET | COMMUNITY

## 2025-07-01 ENCOUNTER — OFFICE VISIT (OUTPATIENT)
Dept: URBAN - METROPOLITAN AREA CLINIC 105 | Facility: CLINIC | Age: 60
End: 2025-07-01
Payer: COMMERCIAL

## 2025-07-01 DIAGNOSIS — Z12.11 SCREEN FOR COLON CANCER: ICD-10-CM

## 2025-07-01 DIAGNOSIS — B20 HIV INFECTION, UNSPECIFIED SYMPTOM STATUS: ICD-10-CM

## 2025-07-01 DIAGNOSIS — R19.7 DIARRHEA, UNSPECIFIED TYPE: ICD-10-CM

## 2025-07-01 DIAGNOSIS — F12.90 MARIJUANA USE: ICD-10-CM

## 2025-07-01 DIAGNOSIS — K59.00 CONSTIPATION, UNSPECIFIED CONSTIPATION TYPE: ICD-10-CM

## 2025-07-01 DIAGNOSIS — K52.832 LYMPHOCYTIC COLITIS: ICD-10-CM

## 2025-07-01 DIAGNOSIS — K52.9 NONINFECTIOUS DIARRHEA: ICD-10-CM

## 2025-07-01 PROCEDURE — 99214 OFFICE O/P EST MOD 30 MIN: CPT | Performed by: INTERNAL MEDICINE

## 2025-07-01 RX ORDER — LOPERAMIDE HYDROCHLORIDE 2 MG/1
1 CAPSULE AS NEEDED CAPSULE ORAL
Status: ACTIVE | COMMUNITY

## 2025-07-01 RX ORDER — BICTEGRAVIR SODIUM, EMTRICITABINE, AND TENOFOVIR ALAFENAMIDE FUMARATE 50; 200; 25 MG/1; MG/1; MG/1
TAKE 1 TABLET BY ORAL ROUTE ONCE DAILY FOR 90 DAYS ORAL ONCE A DAY TABLET ORAL
Qty: 30 EACH | Refills: 0 | Status: ACTIVE | COMMUNITY

## 2025-07-01 RX ORDER — MIDODRINE HYDROCHLORIDE 5 MG/1
TABLET ORAL
Qty: 90 TABLET | Status: ACTIVE | COMMUNITY

## 2025-07-01 RX ORDER — LOPERAMIDE HCL 2 MG/1
1 TABLET TABLET, FILM COATED ORAL TWICE A DAY
Qty: 180 TABLET | Refills: 3 | OUTPATIENT
Start: 2025-07-01 | End: 2026-06-26

## 2025-07-01 NOTE — HPI-TODAY'S VISIT:
The patient presents on follow-up for diarrhea.   On 6/21/22, the patient reported onset of intermittent watery diarrhea a couple years ago. Diarrhea was 1x/week with multiple episodes in a bout. He denied bleeding or abdominal pain. Between bouts, he sometimes could go at least 5 days without a BM. He noted increased diarrhea while on Biktarvy for HIV - d/c the med a year ago because of diarrhea. He said he had not discussed alternative meds with his PCP (FROILAN Best at [facility]).  He was only on vit C currently. Social history: Last alcohol use was a couple months ago. No tobacco use. Last marijuana use was 2 weeks ago - frequency was 1x/month.  On 10/3/22, he said he got the x-ray done following our appt. He also sent in a stool sample in June, but there was an issue with the sample, so he had to resubmit a sample - resubmitted it yesterday. He was still having diarrhea. He saw his PCP since our visit - rx'd metronidazole and loperamide. No stool study was done with the PCP he stated. He did not take metronidazole, but had been taking loperamide for the last 2 weeks, taking 2 pills 1x in a day. Loperamide helped for awhile, but diarrhea returned. He could go 1-3 days without a BM then have days of profuse diarrhea.  He had not been on HIV treatment for almost a year - had initially d/c Biktarvy because he was having diarrhea. He said his PCP had not discussed alternative treatment options with him.  On 11/29/22, he said that after his colon, he took metronidazole 3x starting on the 22nd - 1 dose 3 consecutive days. He had smear incontinence since his colon, but no BM until today.  On 12/20/22, he said he tried Miralax for 3-4 days, but not consistently because he was dealing with health issues among different family members. He had been taking metronidazole PRN for his bowel habit. He could not recall if he had done a bowel prep. He said he was given loperamide, not budesonide, by the pharmacy and was taking it TID.  On 1/31/23, he said Miralax 1 cap QD was working, but he had been having frequent diarrhea (at least 5-6x/day - watery) the past couple days. He had already had 3-4 BMs this morning. Prior, he was going 3-4 days w/o a BM and had some incomplete evac (no straining) whenever he would have a BM. He had not been taking Imodium.  On 3/3/23, he said he last took Miralax 2 days ago - took QOD. He also took an Imodium if not taking Miralax. He was not on Linzess due to insurance change. He tried it for a 1.5 weeks daily, but had too much diarrhea. He had 5-6 watery BMs QD.  On 3/14/23, he said he took budesonide 3 pills QD for 1-2 weeks with no benefit. He was not able to get crofelemer because insurance did not cover it. A week ago, his bowel habit was worse, but now it was better. He had not had a BM in 2 days. No ibuprofen/Advil use. He had not taken Imodium in a week.    On 4/30/24, he said he was having trouble with Imodium. He was taking Imodium 1 pill QD. did not take more than that. On Imodium, he had decreased frequency - 5-6 BMs/day. Off Imodium, he would have constant BMs. Another physician gave a rx version of Imodium that may be cheaper.  He did not trial Mytesi - prescribed at his last visit 3/14/23.  He stated he may have not been covered.    On 7/24/24, he said he started Mytesi, but d/c because it "didn't work like it should" - urgency and incontinence. He went back to taking Imodium, 1 pill QD or 1x/5 days depending on how he felt. B6 supplements made his BMs less loose, so he did not need as much Imodium.  HPI Today, he says in the last 12 months since his last visit, he was off Mytesi for 4 months due to insurance, but now has been able to get more pills. When he took Mytesi, he took it QD instead of the prescribed BID. When on Mytesi, it decreased bloating, but helped by 30% with the diarrhea. He has 3-5 BMs/day, but will have less with Imodium. Without Imodium, stools are watery usually daily. He takes Imodium 1-2 pills in a day. On the Imodium, he notes a result for at least a day - brings frequency to 0-3 BMs in a day. Occasionally gets more formation on the Imodium. On the Imodium, diarrhea is 70% better. He does not take more Imodium in a day due to cost.  Labs 5/15/24 - Stool study negative.  7/24/23 - CMP normal except sodium 135, albumin 3.4. CRP 6.2. 7/23/23 - CBC normal except hgb 12.7, platelets 55. 10/3/22 - Stool study negative. 6/21/22 - CBC normal except platelets 63. CMP normal except creatinine 1.55, AST 42, GFR 53. Celiac negative. TSH/FT4 normal.

## 2025-07-18 ENCOUNTER — TELEPHONE ENCOUNTER (OUTPATIENT)
Dept: URBAN - METROPOLITAN AREA CLINIC 105 | Facility: CLINIC | Age: 60
End: 2025-07-18